# Patient Record
Sex: MALE | Race: WHITE | NOT HISPANIC OR LATINO | URBAN - METROPOLITAN AREA
[De-identification: names, ages, dates, MRNs, and addresses within clinical notes are randomized per-mention and may not be internally consistent; named-entity substitution may affect disease eponyms.]

---

## 2023-09-19 ENCOUNTER — OUTPATIENT (OUTPATIENT)
Dept: OUTPATIENT SERVICES | Facility: HOSPITAL | Age: 78
LOS: 1 days | End: 2023-09-19
Payer: MEDICARE

## 2023-09-19 PROCEDURE — 71046 X-RAY EXAM CHEST 2 VIEWS: CPT

## 2023-09-19 PROCEDURE — 71046 X-RAY EXAM CHEST 2 VIEWS: CPT | Mod: 26

## 2023-10-05 ENCOUNTER — OFFICE (OUTPATIENT)
Dept: URBAN - METROPOLITAN AREA CLINIC 28 | Facility: CLINIC | Age: 78
Setting detail: OPHTHALMOLOGY
End: 2023-10-05
Payer: MEDICARE

## 2023-10-05 DIAGNOSIS — Z96.1: ICD-10-CM

## 2023-10-05 DIAGNOSIS — H18.523: ICD-10-CM

## 2023-10-05 DIAGNOSIS — H44.23: ICD-10-CM

## 2023-10-05 DIAGNOSIS — H35.30: ICD-10-CM

## 2023-10-05 DIAGNOSIS — H02.831: ICD-10-CM

## 2023-10-05 DIAGNOSIS — H43.393: ICD-10-CM

## 2023-10-05 DIAGNOSIS — H02.834: ICD-10-CM

## 2023-10-05 DIAGNOSIS — H35.40: ICD-10-CM

## 2023-10-05 PROCEDURE — 92014 COMPRE OPH EXAM EST PT 1/>: CPT | Performed by: OPHTHALMOLOGY

## 2023-10-05 ASSESSMENT — REFRACTION_CURRENTRX
OS_VPRISM_DIRECTION: SV
OS_AXIS: 086
OD_CYLINDER: -1.00
OS_SPHERE: -9.00
OD_SPHERE: +2.00
OS_CYLINDER: 0.00
OS_VPRISM_DIRECTION: PROGS
OS_ADD: +2.50
OD_SPHERE: -6.25
OD_VPRISM_DIRECTION: PROGS
OD_SPHERE: -6.00
OD_AXIS: 180
OD_OVR_VA: 20/
OS_SPHERE: -9.25
OD_OVR_VA: 20/
OD_VPRISM_DIRECTION: PROGS
OD_ADD: +2.75
OS_CYLINDER: -1.50
OD_ADD: +2.50
OS_OVR_VA: 20/
OS_CYLINDER: -1.75
OD_CYLINDER: 0.00
OS_ADD: +2.75
OS_AXIS: 180
OD_AXIS: 071
OD_AXIS: 065
OS_SPHERE: +2.00
OS_OVR_VA: 20/
OD_VPRISM_DIRECTION: SV
OD_CYLINDER: -1.25
OS_AXIS: 086
OD_OVR_VA: 20/
OS_VPRISM_DIRECTION: PROGS
OS_OVR_VA: 20/

## 2023-10-05 ASSESSMENT — REFRACTION_MANIFEST
OD_VA2: 20/20(J1+)
OS_VA1: 20/20--
OD_AXIS: 065
OS_SPHERE: +0.50
OS_SPHERE: -9.50
OS_ADD: +2.50
OD_CYLINDER: -0.25
OS_AXIS: 085
OD_VA1: 20/40-
OD_AXIS: 005
OD_SPHERE: +0.25
OS_VA1: 20/40-
OS_CYLINDER: -0.50
OD_ADD: +2.50
OD_CYLINDER: -1.00
OD_SPHERE: -9.00
OD_VA1: 20/25-
OS_VA2: 20/20(J1+)
OS_CYLINDER: -1.00
OS_AXIS: 060

## 2023-10-05 ASSESSMENT — AXIALLENGTH_DERIVED
OD_AL: 29.41
OD_AL: 24.5443
OD_AL: 24.5443
OS_AL: 24.3383
OS_AL: 24.4424
OS_AL: 29.64

## 2023-10-05 ASSESSMENT — KERATOMETRY
OD_AXISANGLE_DEGREES: 102
OS_K1POWER_DIOPTERS: 40.75
OS_AXISANGLE_DEGREES: 094
METHOD_AUTO_MANUAL: AUTO
OD_K1POWER_DIOPTERS: 40.50
OD_K2POWER_DIOPTERS: 41.25
OS_K2POWER_DIOPTERS: 41.25

## 2023-10-05 ASSESSMENT — CONFRONTATIONAL VISUAL FIELD TEST (CVF)
OS_FINDINGS: FULL
OD_FINDINGS: FULL

## 2023-10-05 ASSESSMENT — REFRACTION_AUTOREFRACTION
OD_SPHERE: +0.25
OD_AXIS: 161
OS_CYLINDER: -0.50
OD_CYLINDER: -0.25
OS_AXIS: 064
OS_SPHERE: +0.75

## 2023-10-05 ASSESSMENT — SPHEQUIV_DERIVED
OD_SPHEQUIV: 0.125
OS_SPHEQUIV: 0.5
OS_SPHEQUIV: 0.25
OD_SPHEQUIV: -9.5
OD_SPHEQUIV: 0.125
OS_SPHEQUIV: -10

## 2023-10-05 ASSESSMENT — LID EXAM ASSESSMENTS
OD_MEIBOMITIS: 1+
OD_DERMATOCHALASIS: 1+
OD_COMMENTS: PTOSIS OU UL COMMENTS
OS_MEIBOMITIS: 1+
OS_COMMENTS: PTOSIS OU UL COMMENTS
OS_DERMATOCHALASIS: 1+

## 2023-10-05 ASSESSMENT — LID POSITION - DERMATOCHALASIS
OS_DERMATOCHALASIS: 1+
OD_DERMATOCHALASIS: 1+

## 2023-10-05 ASSESSMENT — TONOMETRY
OS_IOP_MMHG: 12
OD_IOP_MMHG: 12

## 2023-10-05 ASSESSMENT — VISUAL ACUITY
OS_BCVA: 20/20-2
OD_BCVA: 20/20-1

## 2024-04-16 PROBLEM — Z00.00 ENCOUNTER FOR PREVENTIVE HEALTH EXAMINATION: Status: ACTIVE | Noted: 2024-04-16

## 2024-05-17 ENCOUNTER — APPOINTMENT (OUTPATIENT)
Dept: UROLOGY | Facility: CLINIC | Age: 79
End: 2024-05-17
Payer: MEDICARE

## 2024-05-17 VITALS
BODY MASS INDEX: 22.53 KG/M2 | HEIGHT: 73 IN | OXYGEN SATURATION: 96 % | TEMPERATURE: 98.06 F | HEART RATE: 60 BPM | DIASTOLIC BLOOD PRESSURE: 76 MMHG | SYSTOLIC BLOOD PRESSURE: 145 MMHG | WEIGHT: 170 LBS

## 2024-05-17 DIAGNOSIS — N52.01 ERECTILE DYSFUNCTION DUE TO ARTERIAL INSUFFICIENCY: ICD-10-CM

## 2024-05-17 DIAGNOSIS — N13.8 BENIGN PROSTATIC HYPERPLASIA WITH LOWER URINARY TRACT SYMPMS: ICD-10-CM

## 2024-05-17 DIAGNOSIS — E78.00 PURE HYPERCHOLESTEROLEMIA, UNSPECIFIED: ICD-10-CM

## 2024-05-17 DIAGNOSIS — Z78.9 OTHER SPECIFIED HEALTH STATUS: ICD-10-CM

## 2024-05-17 DIAGNOSIS — N40.1 BENIGN PROSTATIC HYPERPLASIA WITH LOWER URINARY TRACT SYMPMS: ICD-10-CM

## 2024-05-17 DIAGNOSIS — R39.198 OTHER DIFFICULTIES WITH MICTURITION: ICD-10-CM

## 2024-05-17 DIAGNOSIS — Z86.79 PERSONAL HISTORY OF OTHER DISEASES OF THE CIRCULATORY SYSTEM: ICD-10-CM

## 2024-05-17 PROCEDURE — 99204 OFFICE O/P NEW MOD 45 MIN: CPT

## 2024-05-17 PROCEDURE — 76857 US EXAM PELVIC LIMITED: CPT

## 2024-05-17 RX ORDER — PNV NO.95/FERROUS FUM/FOLIC AC 28MG-0.8MG
TABLET ORAL
Refills: 0 | Status: ACTIVE | COMMUNITY

## 2024-05-17 RX ORDER — SILDENAFIL 100 MG/1
100 TABLET, FILM COATED ORAL
Qty: 30 | Refills: 2 | Status: ACTIVE | COMMUNITY
Start: 2024-05-17 | End: 1900-01-01

## 2024-05-17 RX ORDER — VIT C/E/CUPERIC/ZINC/LUTEIN 226-90-0.8
CAPSULE ORAL
Refills: 0 | Status: ACTIVE | COMMUNITY

## 2024-05-17 RX ORDER — ROSUVASTATIN CALCIUM 5 MG/1
5 TABLET, FILM COATED ORAL
Refills: 0 | Status: ACTIVE | COMMUNITY

## 2024-05-17 RX ORDER — TAMSULOSIN HYDROCHLORIDE 0.4 MG/1
0.4 CAPSULE ORAL
Qty: 90 | Refills: 3 | Status: ACTIVE | COMMUNITY
Start: 2024-05-17 | End: 1900-01-01

## 2024-05-17 RX ORDER — FEBUXOSTAT 40 MG/1
40 TABLET ORAL
Refills: 0 | Status: ACTIVE | COMMUNITY

## 2024-05-17 RX ORDER — LOSARTAN POTASSIUM 100 MG/1
TABLET, FILM COATED ORAL
Refills: 0 | Status: ACTIVE | COMMUNITY

## 2024-05-17 NOTE — PHYSICAL EXAM
[Normal Appearance] : normal appearance [Well Groomed] : well groomed [General Appearance - In No Acute Distress] : no acute distress [Edema] : no peripheral edema [] : no respiratory distress [Abdomen Soft] : soft [Urethral Meatus] : meatus normal [Penis Abnormality] : normal circumcised penis [Urinary Bladder Findings] : the bladder was normal on palpation [Scrotum] : the scrotum was normal [Epididymis] : the epididymides were normal [Testes Tenderness] : no tenderness of the testes [Testes Mass (___cm)] : there were no testicular masses [Prostate Tenderness] : the prostate was not tender [No Prostate Nodules] : no prostate nodules [Prostate Size ___ gm] : prostate size [unfilled] gm [Normal Station and Gait] : the gait and station were normal for the patient's age [Skin Turgor] : supple [No Focal Deficits] : no focal deficits [Oriented To Time, Place, And Person] : oriented to person, place, and time [Affect] : the affect was normal [Mood] : the mood was normal [Not Anxious] : not anxious

## 2024-05-17 NOTE — HISTORY OF PRESENT ILLNESS
[FreeTextEntry1] : [facility] 4/19/2024 78 YOM seen TODAY 5/17/2024 as NPT for urinary frequency, poor urinary stream, urinary leakage. He had a bladder biopsy in 2011 which was benign ([facility]). He comes today due to LUTS of nocturia x 2, poor urinary flow, post urination dribbling, but denies any dysuria or gross hematuria. He also has noted the inability to achieve a rigid erection. He has not tried any directed medication for either of these problems, both of which have been with him for over a decade. He has a 10 YO son and is presently  but is interested in resuming sexual activity. For now, this has only been through masturbation. He takes [facility]ic for gout, Losartan for HTN and rosuvastatin for cholesterol. He is allergic to Sulfa drugs. Patient had recent lab work with his PCP Dr. Gloria 2 weeks ago, NA today. UA negative IPSS 11 ROBERT 2 ALPHONSO 3 Pelvic US: PVR 24ml, Prostate 39 gm.

## 2024-05-17 NOTE — ASSESSMENT
[FreeTextEntry1] : We discussed in detail the patient's lower urinary tract symptoms especially in view of his normal postvoid residual residual bladder volume and mildly enlarged prostate gland.  We discussed several options moving forward and I recommended that he try empiric tamsulosin 0.4 mg daily I reviewed with him the indications risks alternatives and chances for success with this approach and the recommended he start on this and we will reassess in 3 months.  At that time, we will perform some noninvasive studies including uroflowmetry and repeat PVR evaluation.  As for his ED, I also discussed empiric therapy as a first step and we reviewed the use of oral PDE 5 I medications including Viagra and Cialis with the pros and cons of each I recommend he start with sildenafil citrate 100 mg as needed and before sexual activity.  I reviewed with him the optimal method of using this medicine along with the indications risks alternatives and chances for success.  These medicines were ordered through his local pharmacy at his request.  Urine was also sent today for culture and cytology and prior blood reports from 2 weeks earlier were requested from Dr. Gloria's office we will review these and repeat as indicated by the results.  We made plans to reassess in 3 months before the patient left. Hannah Flores MD  PSA received after patient left, normal at 1.11. from 5/3/2024. Hannah Flores MD

## 2024-05-17 NOTE — LETTER BODY
[Dear  ___] : Dear  [unfilled], [Consult Letter:] : I had the pleasure of evaluating your patient, [unfilled]. [Please see my note below.] : Please see my note below. [Consult Closing:] : Thank you very much for allowing me to participate in the care of this patient.  If you have any questions, please do not hesitate to contact me. [FreeTextEntry3] : Best Regards,   Hannah Flores MD

## 2024-05-21 ENCOUNTER — NON-APPOINTMENT (OUTPATIENT)
Age: 79
End: 2024-05-21

## 2024-05-22 DIAGNOSIS — R82.89 OTHER ABNRM FNDNGS ON CYTOLOGICAL: ICD-10-CM

## 2024-05-22 LAB
BACTERIA UR CULT: NORMAL
BILIRUB UR QL STRIP: NORMAL
CLARITY UR: CLEAR
COLLECTION METHOD: NORMAL
GLUCOSE UR-MCNC: NORMAL
HCG UR QL: 0.2 EU/DL
HGB UR QL STRIP.AUTO: NORMAL
KETONES UR-MCNC: NORMAL
LEUKOCYTE ESTERASE UR QL STRIP: NORMAL
NITRITE UR QL STRIP: NORMAL
PH UR STRIP: 6
PROT UR STRIP-MCNC: NORMAL
SP GR UR STRIP: 1.01
URINE CYTOLOGY: NORMAL

## 2024-06-01 LAB
HLX UV FISH FINAL REPORT: NORMAL
URINE CYTOLOGY: NORMAL

## 2024-06-04 ENCOUNTER — NON-APPOINTMENT (OUTPATIENT)
Age: 79
End: 2024-06-04

## 2024-06-21 ENCOUNTER — APPOINTMENT (OUTPATIENT)
Dept: UROLOGY | Facility: CLINIC | Age: 79
End: 2024-06-21
Payer: MEDICARE

## 2024-06-21 PROCEDURE — 99214 OFFICE O/P EST MOD 30 MIN: CPT

## 2024-06-21 NOTE — PHYSICAL EXAM
[General Appearance - In No Acute Distress] : no acute distress [Oriented To Time, Place, And Person] : oriented to person, place, and time [Affect] : the affect was normal [Mood] : the mood was normal

## 2024-06-21 NOTE — LETTER BODY
[Dear  ___] : Dear  [unfilled], [Courtesy Letter:] : I had the pleasure of seeing your patient, [unfilled], in my office today. [Please see my note below.] : Please see my note below. [Consult Closing:] : Thank you very much for allowing me to participate in the care of this patient.  If you have any questions, please do not hesitate to contact me. [FreeTextEntry3] : Best Regards,   Hannah Flores MD

## 2024-06-21 NOTE — HISTORY OF PRESENT ILLNESS
[FreeTextEntry1] :  The patient-doctor relationship has been established in a face to face fashion via real time video/audio HIPAA compliant communication using telemedicine software. The patient's identity has been confirmed. The patient was previously emailed a copy of the telemedicine consent. They have had a chance to review and has now given verbal consent and has requested care to be assessed and treated through telemedicine and understands there maybe limitations in this process and they may need further follow up care in the office and or hospital settings.  Verbal consent given on 6/21/2024, at 12:00 Noon, by Maury Hidalgo.  [facility] 4/19/2024 78 YOM seen 5/17/2024 as NPT for urinary frequency, poor urinary stream, urinary leakage. He had a bladder biopsy in 2011 which was benign ([facility]). He comes today due to LUTS of nocturia x 2, poor urinary flow, post urination dribbling, but denies any dysuria or gross hematuria. He also has noted the inability to achieve a rigid erection. He has not tried any directed medication for either of these problems, both of which have been with him for over a decade. He has a 10 YO son and is presently  but is interested in resuming sexual activity. For now, this has only been through masturbation. He takes [facility] for gout, Losartan for HTN and rosuvastatin for cholesterol. He is allergic to Sulfa drugs. Patient had recent lab work with his PCP Dr. Gloria 2 weeks ago, NA today. UA negative IPSS 11 ROBERT 2 ALPHONSO 3 Pelvic US: PVR 24ml, Prostate 39 gm. We discussed in detail the patient's lower urinary tract symptoms especially in view of his normal postvoid residual bladder volume and mildly enlarged prostate gland. We discussed several options moving forward and I recommended that he try empiric tamsulosin 0.4 mg daily I reviewed with him the indications risks alternatives and chances for success with this approach and the recommended he start on this and we will reassess in 3 months. At that time, we will perform some noninvasive studies including uroflowmetry and repeat PVR evaluation. As for his ED, I also discussed empiric therapy as a first step and we reviewed the use of oral PDE 5 I medications including Viagra and Cialis with the pros and cons of each I recommend he start with sildenafil citrate 100 mg as needed and before sexual activity. I reviewed with him the optimal method of using this medicine along with the indications risks alternatives and chances for success. These medicines were ordered through his local pharmacy at his request. Urine was also sent today for culture and cytology and prior blood reports from 2 weeks earlier were requested from Dr. Gloria's office we will review these and repeat as indicated by the results. We made plans to reassess in 3 months before the patient left. PSA received after patient left, normal at 1.11. from 5/3/2024. C+S negative Cytology rare clusters of urothelial cells. Results reviewed with patient by VM, recommend he DO urine to repeat cytology and get FISH test now.    Pt was called. He confirmed receipt of Dr. Sandra OSEGUERA of Sat. Jun 1, 2024, and verbalized his understanding. Pt will schedule the CT at [facility] and requested to speak with Dr. Flores of his concerns before having the cystoscopy scheduled. Dr. Flores will be made aware.  ALESSIA SEARS R.N.; Jun 4 2024   Repeat cytology again with mild atypia of 3-4 clusters of urothelial cells, FISH negative. CT, cystoscopy pending.  Patient seen TODAY 6/21/2024 via [facility] to review results and discuss recommended cystoscopy. 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 He is scheduled for the CT scan on 6/25/2024 and is hesitant to undergo another cystoscopy due to his last experience.

## 2024-06-21 NOTE — ASSESSMENT
[FreeTextEntry1] : I discussed with the patient today the results from the 2 previous cytologies both of which demonstrated mild urinary atypia with 3D clusters of urothelial cells.  We did also discussed the fact that while this is likely from a benign etiology, we cannot these tests alone rule out the possibility of a low-grade bladder tumor.  I again recommended that we proceed with the CT scan as scheduled and follow that with a cystoscopy to clear the bladder mucosa.  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  Patient was allowed to ask all of his questions which were answered to his satisfaction and he agrees with this approach.  He will call to schedule the cystoscopy after the CT scan. Hannah Flores MD

## 2024-06-25 ENCOUNTER — OUTPATIENT (OUTPATIENT)
Dept: OUTPATIENT SERVICES | Facility: HOSPITAL | Age: 79
LOS: 1 days | End: 2024-06-25
Payer: MEDICARE

## 2024-06-25 ENCOUNTER — APPOINTMENT (OUTPATIENT)
Dept: CT IMAGING | Facility: HOSPITAL | Age: 79
End: 2024-06-25

## 2024-06-25 LAB — POCT ISTAT CREATININE: 2.1 MG/DL — HIGH (ref 0.5–1.3)

## 2024-06-25 PROCEDURE — 74178 CT ABD&PLV WO CNTR FLWD CNTR: CPT

## 2024-06-25 PROCEDURE — 82565 ASSAY OF CREATININE: CPT

## 2024-06-25 PROCEDURE — 74178 CT ABD&PLV WO CNTR FLWD CNTR: CPT | Mod: 26,MH

## 2024-07-01 ENCOUNTER — NON-APPOINTMENT (OUTPATIENT)
Age: 79
End: 2024-07-01

## 2024-08-07 ENCOUNTER — NON-APPOINTMENT (OUTPATIENT)
Age: 79
End: 2024-08-07

## 2024-08-14 ENCOUNTER — APPOINTMENT (OUTPATIENT)
Dept: UROLOGY | Facility: CLINIC | Age: 79
End: 2024-08-14
Payer: MEDICARE

## 2024-08-14 DIAGNOSIS — R39.198 OTHER DIFFICULTIES WITH MICTURITION: ICD-10-CM

## 2024-08-14 DIAGNOSIS — N13.8 BENIGN PROSTATIC HYPERPLASIA WITH LOWER URINARY TRACT SYMPMS: ICD-10-CM

## 2024-08-14 DIAGNOSIS — N40.1 BENIGN PROSTATIC HYPERPLASIA WITH LOWER URINARY TRACT SYMPMS: ICD-10-CM

## 2024-08-14 DIAGNOSIS — N52.01 ERECTILE DYSFUNCTION DUE TO ARTERIAL INSUFFICIENCY: ICD-10-CM

## 2024-08-14 DIAGNOSIS — R82.89 OTHER ABNRM FNDNGS ON CYTOLOGICAL: ICD-10-CM

## 2024-08-14 PROCEDURE — 81003 URINALYSIS AUTO W/O SCOPE: CPT | Mod: QW

## 2024-08-14 PROCEDURE — 52000 CYSTOURETHROSCOPY: CPT

## 2024-08-14 PROCEDURE — 51798 US URINE CAPACITY MEASURE: CPT

## 2024-08-14 NOTE — ASSESSMENT
[FreeTextEntry1] : 79M with history of abnormal cytology.Cystoscopy today notable for inflammatory changes in the distal prostatic urethra. Risks, benefits and alternatives of biopsy were discussed with patient. He has decided to proceed with observation for now, will return in 3 months  -Will return in 3 months for urine cytology and FISH. Hannah Flores MD

## 2024-08-14 NOTE — HISTORY OF PRESENT ILLNESS
[FreeTextEntry1] : [facility] 4/19/2024 79 YOM seen 5/17/2024 as NPT for urinary frequency, poor urinary stream, urinary leakage. He had a bladder biopsy in 2011 which was benign ([facility]). He comes today due to LUTS of nocturia x 2, poor urinary flow, post urination dribbling, but denies any dysuria or gross hematuria. He also has noted the inability to achieve a rigid erection. He has not tried any directed medication for either of these problems, both of which have been with him for over a decade. He has a 10 YO son and is presently  but is interested in resuming sexual activity. For now, this has only been through masturbation. He takes [facility] for gout, Losartan for HTN and rosuvastatin for cholesterol. He is allergic to Sulfa drugs. Patient had recent lab work with his PCP Dr. Gloria 2 weeks ago, NA today. UA negative IPSS 11 ROBERT 2 ALPHONSO 3 Pelvic US: PVR 24ml, Prostate 39 gm. We discussed in detail the patient's lower urinary tract symptoms especially in view of his normal postvoid residual bladder volume and mildly enlarged prostate gland. We discussed several options moving forward and I recommended that he try empiric tamsulosin 0.4 mg daily I reviewed with him the indications risks alternatives and chances for success with this approach and the recommended he start on this and we will reassess in 3 months. At that time, we will perform some noninvasive studies including uroflowmetry and repeat PVR evaluation. As for his ED, I also discussed empiric therapy as a first step and we reviewed the use of oral PDE 5 I medications including Viagra and Cialis with the pros and cons of each I recommend he start with sildenafil citrate 100 mg as needed and before sexual activity. I reviewed with him the optimal method of using this medicine along with the indications risks alternatives and chances for success. These medicines were ordered through his local pharmacy at his request. Urine was also sent today for culture and cytology and prior blood reports from 2 weeks earlier were requested from Dr. Gloria's office we will review these and repeat as indicated by the results. We made plans to reassess in 3 months before the patient left. PSA received after patient left, normal at 1.11. from 5/3/2024. C+S negative Cytology rare clusters of urothelial cells. Results reviewed with patient by VM, recommend he DO urine to repeat cytology and get FISH test now.    Pt was called. He confirmed receipt of Dr. Sandra OSEGUERA of Sat. Jun 1, 2024, and verbalized his understanding. Pt will schedule the CT at [facility] and requested to speak with Dr. Flores of his concerns before having the cystoscopy scheduled. Dr. Flores will be made aware.  ALESSIA CALELS R.N.; Jun 4 2024   Repeat cytology again with mild atypia of 3-4 clusters of urothelial cells, FISH negative. CT, cystoscopy pending.  Patient seen 6/21/2024 via [facility] to review results and discuss recommended cystoscopy. 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 He is scheduled for the CT scan on 6/25/2024 and is hesitant to undergo another cystoscopy due to his last experience. I discussed with the patient today the results from the 2 previous cytologies both of which demonstrated mild urinary atypia with 3D clusters of urothelial cells. We did also discussed the fact that while this is likely from a benign etiology, we cannot these tests alone rule out the possibility of a low-grade bladder tumor. I again recommended that we proceed with the CT scan as scheduled and follow that with a cystoscopy to clear the bladder mucosa. 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 Patient was allowed to ask all of his questions which were answered to his satisfaction and he agrees with this approach. He will call to schedule the cystoscopy after the CT scan.    CT SCAN 6/25/2024: IMPRESSION: No gross urothelial lesion visualized. Bladder appears thick-walled. This may be exaggerated by under distention although muscular hypertrophy or cystitis cannot be excluded. Please correlate clinically. Bilateral non obstructing renal stones. Small renal cysts and sub centimeter low-attenuation lesions which are too small to characterize. Results reviewed with patient by phone, recommend cystoscopy now, he will proceed, we will use [facility], staff to call and set that up.  8/7/2024 Patient called back, spoke with Candy Calles RN, informed us that in recent discussion with his Cardiologist since last visit with me, he was told OK to take tadalafil but NOT tamsulosin. We will continue with the tadalafil and add Finasteride for his LUTS.  Patient seen TODAY 8/14/2024 for Flow, PVR, cystoscopy. IPSS/QOL 7/4 Flow: inaccurate (66ml) PVR 3 UA negative CYSTOSCOPY: cystourethroscopy today was notable for prostatic urethra inflammatory changes. The prostate exhibited trilobar hypertrophy, the bladder wall was trabeculated without mucosal abnormalities. The distal urethra was without strictures or lesions.

## 2024-08-14 NOTE — HISTORY OF PRESENT ILLNESS
[FreeTextEntry1] : [facility] 4/19/2024 79 YOM seen 5/17/2024 as NPT for urinary frequency, poor urinary stream, urinary leakage. He had a bladder biopsy in 2011 which was benign ([facility]). He comes today due to LUTS of nocturia x 2, poor urinary flow, post urination dribbling, but denies any dysuria or gross hematuria. He also has noted the inability to achieve a rigid erection. He has not tried any directed medication for either of these problems, both of which have been with him for over a decade. He has a 10 YO son and is presently  but is interested in resuming sexual activity. For now, this has only been through masturbation. He takes [facility] for gout, Losartan for HTN and rosuvastatin for cholesterol. He is allergic to Sulfa drugs. Patient had recent lab work with his PCP Dr. Gloria 2 weeks ago, NA today. UA negative IPSS 11 ROBERT 2 ALPHONSO 3 Pelvic US: PVR 24ml, Prostate 39 gm. We discussed in detail the patient's lower urinary tract symptoms especially in view of his normal postvoid residual bladder volume and mildly enlarged prostate gland. We discussed several options moving forward and I recommended that he try empiric tamsulosin 0.4 mg daily I reviewed with him the indications risks alternatives and chances for success with this approach and the recommended he start on this and we will reassess in 3 months. At that time, we will perform some noninvasive studies including uroflowmetry and repeat PVR evaluation. As for his ED, I also discussed empiric therapy as a first step and we reviewed the use of oral PDE 5 I medications including Viagra and Cialis with the pros and cons of each I recommend he start with sildenafil citrate 100 mg as needed and before sexual activity. I reviewed with him the optimal method of using this medicine along with the indications risks alternatives and chances for success. These medicines were ordered through his local pharmacy at his request. Urine was also sent today for culture and cytology and prior blood reports from 2 weeks earlier were requested from Dr. Gloria's office we will review these and repeat as indicated by the results. We made plans to reassess in 3 months before the patient left. PSA received after patient left, normal at 1.11. from 5/3/2024. C+S negative Cytology rare clusters of urothelial cells. Results reviewed with patient by VM, recommend he DO urine to repeat cytology and get FISH test now.    Pt was called. He confirmed receipt of Dr. Sandra OSEGUERA of Sat. Jun 1, 2024, and verbalized his understanding. Pt will schedule the CT at [facility] and requested to speak with Dr. Flores of his concerns before having the cystoscopy scheduled. Dr. Flores will be made aware.  ALESSIA CALLES R.N.; Jun 4 2024   Repeat cytology again with mild atypia of 3-4 clusters of urothelial cells, FISH negative. CT, cystoscopy pending.  Patient seen 6/21/2024 via [facility] to review results and discuss recommended cystoscopy. 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 He is scheduled for the CT scan on 6/25/2024 and is hesitant to undergo another cystoscopy due to his last experience. I discussed with the patient today the results from the 2 previous cytologies both of which demonstrated mild urinary atypia with 3D clusters of urothelial cells. We did also discussed the fact that while this is likely from a benign etiology, we cannot these tests alone rule out the possibility of a low-grade bladder tumor. I again recommended that we proceed with the CT scan as scheduled and follow that with a cystoscopy to clear the bladder mucosa. 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 Patient was allowed to ask all of his questions which were answered to his satisfaction and he agrees with this approach. He will call to schedule the cystoscopy after the CT scan.    CT SCAN 6/25/2024: IMPRESSION: No gross urothelial lesion visualized. Bladder appears thick-walled. This may be exaggerated by under distention although muscular hypertrophy or cystitis cannot be excluded. Please correlate clinically. Bilateral non obstructing renal stones. Small renal cysts and sub centimeter low-attenuation lesions which are too small to characterize. Results reviewed with patient by phone, recommend cystoscopy now, he will proceed, we will use [facility], staff to call and set that up.  8/7/2024 Patient called back, spoke with Candy Calles RN, informed us that in recent discussion with his Cardiologist since last visit with me, he was told OK to take tadalafil but NOT tamsulosin. We will continue with the tadalafil and add Finasteride for his LUTS.  Patient seen TODAY 8/14/2024 for Flow, PVR, cystoscopy. IPSS/QOL 7/4 Flow: inaccurate (66ml) PVR 3 UA negative CYSTOSCOPY: cystourethroscopy today was notable for prostatic urethra inflammatory changes. The prostate exhibited trilobar hypertrophy, the bladder wall was trabeculated without mucosal abnormalities. The distal urethra was without strictures or lesions.

## 2024-11-11 ENCOUNTER — OFFICE (OUTPATIENT)
Dept: URBAN - METROPOLITAN AREA CLINIC 28 | Facility: CLINIC | Age: 79
Setting detail: OPHTHALMOLOGY
End: 2024-11-11
Payer: MEDICARE

## 2024-11-11 DIAGNOSIS — H18.523: ICD-10-CM

## 2024-11-11 DIAGNOSIS — H35.40: ICD-10-CM

## 2024-11-11 DIAGNOSIS — H35.30: ICD-10-CM

## 2024-11-11 DIAGNOSIS — H43.393: ICD-10-CM

## 2024-11-11 DIAGNOSIS — H02.831: ICD-10-CM

## 2024-11-11 DIAGNOSIS — Z96.1: ICD-10-CM

## 2024-11-11 DIAGNOSIS — H02.834: ICD-10-CM

## 2024-11-11 DIAGNOSIS — H44.23: ICD-10-CM

## 2024-11-11 DIAGNOSIS — H52.4: ICD-10-CM

## 2024-11-11 PROCEDURE — 92134 CPTRZ OPH DX IMG PST SGM RTA: CPT | Performed by: OPHTHALMOLOGY

## 2024-11-11 PROCEDURE — 92014 COMPRE OPH EXAM EST PT 1/>: CPT | Performed by: OPHTHALMOLOGY

## 2024-11-11 PROCEDURE — 92015 DETERMINE REFRACTIVE STATE: CPT | Performed by: OPHTHALMOLOGY

## 2024-11-11 ASSESSMENT — REFRACTION_MANIFEST
OS_SPHERE: -9.50
OD_ADD: +2.50
OD_SPHERE: +0.25
OD_AXIS: 025
OD_CYLINDER: -0.25
OS_VA1: 20/40-
OD_AXIS: 005
OS_AXIS: 085
OD_VA1: 20/25
OS_AXIS: 060
OS_VA2: 20/20(J1+)
OD_CYLINDER: -1.00
OS_CYLINDER: -0.50
OD_SPHERE: +0.50
OD_SPHERE: -9.00
OS_SPHERE: +0.50
OS_ADD: +2.50
OD_VA1: 20/25-
OD_CYLINDER: -0.25
OS_AXIS: 015
OS_SPHERE: +0.50
OD_ADD: +2.50
OD_VA1: 20/40-
OD_AXIS: 065
OS_VA1: 20/20-
OD_VA2: 20/20(J1+)
OS_ADD: +2.50
OS_VA1: 20/20--
OS_CYLINDER: -1.00
OU_VA: 20/20
OS_CYLINDER: -0.50

## 2024-11-11 ASSESSMENT — REFRACTION_CURRENTRX
OS_SPHERE: -9.25
OD_SPHERE: +1.75
OD_ADD: +2.50
OD_AXIS: 065
OS_VPRISM_DIRECTION: SV
OS_OVR_VA: 20/
OD_ADD: +2.75
OD_OVR_VA: 20/
OS_CYLINDER: 0.00
OD_VPRISM_DIRECTION: PROGS
OD_VPRISM_DIRECTION: SV
OS_AXIS: 180
OS_AXIS: 086
OD_OVR_VA: 20/
OD_AXIS: 180
OD_OVR_VA: 20/
OD_VPRISM_DIRECTION: PROGS
OS_ADD: +2.75
OD_SPHERE: -6.25
OD_AXIS: 071
OS_SPHERE: +1.75
OD_CYLINDER: -1.25
OS_SPHERE: +2.00
OS_ADD: +2.50
OS_VPRISM_DIRECTION: PROGS
OS_AXIS: 086
OS_CYLINDER: -1.75
OS_OVR_VA: 20/
OD_CYLINDER: -1.00
OS_VPRISM_DIRECTION: PROGS
OD_SPHERE: +2.00
OS_OVR_VA: 20/
OS_CYLINDER: -1.50
OD_CYLINDER: 0.00
OD_SPHERE: -6.00
OS_SPHERE: -9.00

## 2024-11-11 ASSESSMENT — LID EXAM ASSESSMENTS
OD_DERMATOCHALASIS: 1+
OD_MEIBOMITIS: 1+
OS_DERMATOCHALASIS: 1+
OD_COMMENTS: PTOSIS OU UL COMMENTS
OS_MEIBOMITIS: 1+
OS_COMMENTS: PTOSIS OU UL COMMENTS

## 2024-11-11 ASSESSMENT — KERATOMETRY
OD_K2POWER_DIOPTERS: 41.50
OD_AXISANGLE_DEGREES: 101
METHOD_AUTO_MANUAL: AUTO
OS_K2POWER_DIOPTERS: 41.00
OS_K1POWER_DIOPTERS: 40.50
OD_K1POWER_DIOPTERS: 40.50
OS_AXISANGLE_DEGREES: 084

## 2024-11-11 ASSESSMENT — REFRACTION_AUTOREFRACTION
OS_SPHERE: +0.50
OD_AXIS: 021
OD_CYLINDER: -0.50
OD_SPHERE: +0.50
OS_CYLINDER: -0.50
OS_AXIS: 017

## 2024-11-11 ASSESSMENT — LID POSITION - DERMATOCHALASIS
OD_DERMATOCHALASIS: 1+
OS_DERMATOCHALASIS: 1+

## 2024-11-11 ASSESSMENT — TONOMETRY
OS_IOP_MMHG: 13
OD_IOP_MMHG: 11

## 2024-11-11 ASSESSMENT — VISUAL ACUITY
OS_BCVA: 20/20-1
OD_BCVA: 20/20-2

## 2024-11-11 ASSESSMENT — CONFRONTATIONAL VISUAL FIELD TEST (CVF)
OD_FINDINGS: FULL
OS_FINDINGS: FULL

## 2024-11-13 ENCOUNTER — APPOINTMENT (OUTPATIENT)
Dept: UROLOGY | Facility: CLINIC | Age: 79
End: 2024-11-13
Payer: MEDICARE

## 2024-11-13 DIAGNOSIS — R82.89 OTHER ABNRM FNDNGS ON CYTOLOGICAL: ICD-10-CM

## 2024-11-13 PROCEDURE — 51798 US URINE CAPACITY MEASURE: CPT

## 2024-11-13 PROCEDURE — 99213 OFFICE O/P EST LOW 20 MIN: CPT

## 2024-11-22 ENCOUNTER — NON-APPOINTMENT (OUTPATIENT)
Age: 79
End: 2024-11-22

## 2024-11-22 LAB
BACTERIA UR CULT: NORMAL
HLX UV FISH FINAL REPORT: NORMAL
URINE CYTOLOGY: NORMAL

## 2025-03-31 ENCOUNTER — OFFICE (OUTPATIENT)
Dept: URBAN - METROPOLITAN AREA CLINIC 28 | Facility: CLINIC | Age: 80
Setting detail: OPHTHALMOLOGY
End: 2025-03-31
Payer: MEDICARE

## 2025-03-31 DIAGNOSIS — H18.523: ICD-10-CM

## 2025-03-31 DIAGNOSIS — H00.14: ICD-10-CM

## 2025-03-31 DIAGNOSIS — H02.831: ICD-10-CM

## 2025-03-31 DIAGNOSIS — Z96.1: ICD-10-CM

## 2025-03-31 DIAGNOSIS — H02.834: ICD-10-CM

## 2025-03-31 PROCEDURE — 92012 INTRM OPH EXAM EST PATIENT: CPT | Performed by: OPHTHALMOLOGY

## 2025-03-31 ASSESSMENT — REFRACTION_CURRENTRX
OD_VPRISM_DIRECTION: SV
OS_SPHERE: +2.00
OD_OVR_VA: 20/
OS_OVR_VA: 20/
OD_SPHERE: -6.00
OD_OVR_VA: 20/
OS_SPHERE: -9.25
OD_SPHERE: +1.75
OS_OVR_VA: 20/
OS_SPHERE: +1.75
OS_CYLINDER: 0.00
OS_OVR_VA: 20/
OS_ADD: +2.75
OD_CYLINDER: 0.00
OS_CYLINDER: -1.50
OD_SPHERE: -6.25
OD_OVR_VA: 20/
OS_CYLINDER: -1.75
OS_SPHERE: -9.00
OS_AXIS: 086
OS_VPRISM_DIRECTION: PROGS
OS_ADD: +2.50
OD_VPRISM_DIRECTION: PROGS
OD_SPHERE: +2.00
OD_ADD: +2.50
OD_AXIS: 180
OD_ADD: +2.75
OD_CYLINDER: -1.25
OD_VPRISM_DIRECTION: PROGS
OS_VPRISM_DIRECTION: SV
OS_VPRISM_DIRECTION: PROGS
OD_AXIS: 065
OS_AXIS: 180
OD_AXIS: 071
OD_CYLINDER: -1.00
OS_AXIS: 086

## 2025-03-31 ASSESSMENT — REFRACTION_MANIFEST
OD_SPHERE: +0.25
OS_ADD: +2.50
OD_CYLINDER: -1.00
OS_SPHERE: -9.50
OU_VA: 20/20
OD_SPHERE: +0.50
OS_ADD: +2.50
OS_SPHERE: +0.50
OD_AXIS: 065
OS_AXIS: 060
OS_CYLINDER: -0.50
OS_VA1: 20/20-
OS_CYLINDER: -1.00
OD_ADD: +2.50
OD_CYLINDER: -0.25
OD_VA1: 20/40-
OD_AXIS: 005
OD_VA1: 20/25
OS_SPHERE: +0.50
OD_AXIS: 025
OS_VA1: 20/20--
OS_VA2: 20/20(J1+)
OS_AXIS: 015
OD_VA1: 20/25-
OD_ADD: +2.50
OD_VA2: 20/20(J1+)
OD_SPHERE: -9.00
OS_VA1: 20/40-
OD_CYLINDER: -0.25
OS_AXIS: 085
OS_CYLINDER: -0.50

## 2025-03-31 ASSESSMENT — VISUAL ACUITY
OS_BCVA: 20/20-1
OD_BCVA: 20/20-2

## 2025-03-31 ASSESSMENT — CONFRONTATIONAL VISUAL FIELD TEST (CVF)
OD_FINDINGS: FULL
OS_FINDINGS: FULL

## 2025-03-31 ASSESSMENT — TONOMETRY: OS_IOP_MMHG: 13

## 2025-04-21 ENCOUNTER — OFFICE (OUTPATIENT)
Dept: URBAN - METROPOLITAN AREA CLINIC 28 | Facility: CLINIC | Age: 80
Setting detail: OPHTHALMOLOGY
End: 2025-04-21
Payer: MEDICARE

## 2025-04-21 DIAGNOSIS — H02.834: ICD-10-CM

## 2025-04-21 DIAGNOSIS — H18.523: ICD-10-CM

## 2025-04-21 DIAGNOSIS — H02.831: ICD-10-CM

## 2025-04-21 DIAGNOSIS — H00.14: ICD-10-CM

## 2025-04-21 DIAGNOSIS — Z96.1: ICD-10-CM

## 2025-04-21 PROCEDURE — 92012 INTRM OPH EXAM EST PATIENT: CPT | Performed by: OPHTHALMOLOGY

## 2025-04-21 ASSESSMENT — REFRACTION_MANIFEST
OS_AXIS: 085
OD_SPHERE: +0.50
OD_CYLINDER: -0.25
OS_VA2: 20/20(J1+)
OD_AXIS: 025
OS_VA1: 20/40-
OS_SPHERE: +0.50
OD_CYLINDER: -1.00
OD_VA1: 20/40-
OS_SPHERE: +0.50
OD_AXIS: 065
OD_VA1: 20/25-
OS_ADD: +2.50
OD_SPHERE: -9.00
OS_VA1: 20/20--
OD_VA2: 20/20(J1+)
OD_AXIS: 005
OD_ADD: +2.50
OD_CYLINDER: -0.25
OS_SPHERE: -9.50
OS_CYLINDER: -0.50
OS_AXIS: 015
OD_ADD: +2.50
OS_CYLINDER: -0.50
OD_VA1: 20/25
OS_ADD: +2.50
OS_AXIS: 060
OS_VA1: 20/20-
OD_SPHERE: +0.25
OU_VA: 20/20
OS_CYLINDER: -1.00

## 2025-04-21 ASSESSMENT — REFRACTION_CURRENTRX
OD_SPHERE: -6.00
OD_VPRISM_DIRECTION: PROGS
OS_SPHERE: -9.25
OS_OVR_VA: 20/
OS_ADD: +2.75
OD_VPRISM_DIRECTION: PROGS
OS_VPRISM_DIRECTION: SV
OD_CYLINDER: -1.00
OD_SPHERE: +2.00
OS_AXIS: 086
OS_VPRISM_DIRECTION: PROGS
OD_AXIS: 071
OD_OVR_VA: 20/
OS_CYLINDER: -1.75
OD_ADD: +2.75
OS_AXIS: 180
OD_ADD: +2.50
OS_SPHERE: +2.00
OD_CYLINDER: -1.25
OS_CYLINDER: -1.50
OD_AXIS: 180
OD_OVR_VA: 20/
OS_SPHERE: +1.75
OD_CYLINDER: 0.00
OS_CYLINDER: 0.00
OD_SPHERE: +1.75
OD_SPHERE: -6.25
OD_OVR_VA: 20/
OS_VPRISM_DIRECTION: PROGS
OD_VPRISM_DIRECTION: SV
OS_OVR_VA: 20/
OS_ADD: +2.50
OS_OVR_VA: 20/
OS_AXIS: 086
OS_SPHERE: -9.00
OD_AXIS: 065

## 2025-04-21 ASSESSMENT — LID POSITION - DERMATOCHALASIS
OS_DERMATOCHALASIS: 1+
OD_DERMATOCHALASIS: 1+

## 2025-04-21 ASSESSMENT — LID EXAM ASSESSMENTS
OD_DERMATOCHALASIS: 1+
OS_MEIBOMITIS: 1+
OD_MEIBOMITIS: 1+
OS_DERMATOCHALASIS: 1+
OD_COMMENTS: PTOSIS OU UL COMMENTS
OS_COMMENTS: PTOSIS OU UL COMMENTS

## 2025-04-21 ASSESSMENT — KERATOMETRY
OS_K1POWER_DIOPTERS: 40.75
OS_K2POWER_DIOPTERS: 41.50
OS_AXISANGLE_DEGREES: 078
OD_AXISANGLE_DEGREES: 101
OD_K2POWER_DIOPTERS: 41.00
METHOD_AUTO_MANUAL: AUTO
OD_K1POWER_DIOPTERS: 40.25

## 2025-04-21 ASSESSMENT — REFRACTION_AUTOREFRACTION
OS_AXIS: 035
OS_CYLINDER: -0.25
OD_AXIS: 085
OD_CYLINDER: -0.25
OS_SPHERE: +0.50
OD_SPHERE: +0.75

## 2025-04-21 ASSESSMENT — VISUAL ACUITY
OD_BCVA: 20/20
OS_BCVA: 20/20-2

## 2025-04-21 ASSESSMENT — TONOMETRY: OS_IOP_MMHG: 13

## 2025-04-21 ASSESSMENT — SUPERFICIAL PUNCTATE KERATITIS (SPK): OD_SPK: ABSENT

## 2025-05-30 ENCOUNTER — APPOINTMENT (OUTPATIENT)
Dept: UROLOGY | Facility: CLINIC | Age: 80
End: 2025-05-30
Payer: MEDICARE

## 2025-05-30 ENCOUNTER — NON-APPOINTMENT (OUTPATIENT)
Age: 80
End: 2025-05-30

## 2025-05-30 VITALS
OXYGEN SATURATION: 96 % | TEMPERATURE: 97.7 F | SYSTOLIC BLOOD PRESSURE: 119 MMHG | HEART RATE: 55 BPM | DIASTOLIC BLOOD PRESSURE: 74 MMHG

## 2025-05-30 DIAGNOSIS — N40.1 BENIGN PROSTATIC HYPERPLASIA WITH LOWER URINARY TRACT SYMPMS: ICD-10-CM

## 2025-05-30 DIAGNOSIS — N52.01 ERECTILE DYSFUNCTION DUE TO ARTERIAL INSUFFICIENCY: ICD-10-CM

## 2025-05-30 DIAGNOSIS — R82.89 OTHER ABNRM FNDNGS ON CYTOLOGICAL: ICD-10-CM

## 2025-05-30 DIAGNOSIS — N13.8 BENIGN PROSTATIC HYPERPLASIA WITH LOWER URINARY TRACT SYMPMS: ICD-10-CM

## 2025-05-30 LAB
BILIRUB UR QL STRIP: NORMAL
CLARITY UR: CLEAR
COLLECTION METHOD: NORMAL
GLUCOSE UR-MCNC: NORMAL
HCG UR QL: 0.2 EU/DL
HGB UR QL STRIP.AUTO: NORMAL
KETONES UR-MCNC: NORMAL
LEUKOCYTE ESTERASE UR QL STRIP: NORMAL
NITRITE UR QL STRIP: NORMAL
PH UR STRIP: 6
PROT UR STRIP-MCNC: NORMAL
SP GR UR STRIP: 1.01

## 2025-05-30 PROCEDURE — 51798 US URINE CAPACITY MEASURE: CPT

## 2025-05-30 PROCEDURE — 99214 OFFICE O/P EST MOD 30 MIN: CPT

## 2025-06-04 ENCOUNTER — NON-APPOINTMENT (OUTPATIENT)
Age: 80
End: 2025-06-04

## 2025-06-04 LAB
BACTERIA UR CULT: NORMAL
HLX UV FISH FINAL REPORT: NORMAL
PSA SERPL-MCNC: 1.55 NG/ML
URINE CYTOLOGY: NORMAL